# Patient Record
Sex: FEMALE | Race: WHITE | HISPANIC OR LATINO | URBAN - METROPOLITAN AREA
[De-identification: names, ages, dates, MRNs, and addresses within clinical notes are randomized per-mention and may not be internally consistent; named-entity substitution may affect disease eponyms.]

---
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
## 2022-10-14 NOTE — ED PEDIATRIC NURSE NOTE - NS ED NURSE RECORD ANOTHER VITAL SIGN
Subjective   Patient ID: Joyce is a 47 year old female.    Patient and provider first language is Samoan, per patient request,  visit was conducted in Samoan.  I  Reviewed prior clinic notes, consultants notes, ER notes, imaging and laboratory - 10  minutes    Chief Complaint   Patient presents with   • Physical     F/u on results     Physical and f/u chronic conditions, tests results    Tests results  Normal fasting glucose  Normal blood electrolytes  Normal kidney and liver function test  Elevated cholesterol of 218, elevated triglycerides at 201, recommended to do low-carb diet, regular physical activity and weight loss.   Normal blood cells, normal hemoglobin, no anemia    Physical  Hepatitis B Vaccine(1 of 3 - 3-dose series) Never done  Influenza Vaccine(1) due on 09/01/2022  Breast Cancer Screening due on 01/14/2023  Depression Screening due on 04/15/2023  Cervical Cancer Screen 30-64 - due on 11/02/2026  DTaP/Tdap/Td Vaccine(2 - Td or Tdap) due on 11/02/2031  Colorectal Cancer Screen- due on 06/09/2032  COVID-19 Vaccine Completed  Meningococcal Vaccine Aged Out  HPV Vaccine Aged Out  Pneumococcal Vaccine 0-64 Aged Out    HTN  Well conrtrolled  No CP, heart palpitations, SOB, orthopnea, pedal edema, no dizziness or lightheadedness.  No syncope.  Feels energy level is about the same  C/w  lisinopril 10 mg PO every day, refill sent to pharmacy  RTC 2-4 weeks with BP logs  Reassess with numbers management    Hyperlipidemia, mixed  Elevated cholesterol of 218, elevated triglycerides at 201;  recommended a low CH and low fat diet, regular physical activity and wg loss, exercising only 2 times a week bought [employer]   Repeat LP in 2 months, fasting if elevated numbers consider rx    Overweight  Elevated BMI above 25  Weight loss, regular physical activity, low-calorie diet        Patient's medications, allergies, past medical, surgical, social and family histories were reviewed and updated as appropriate.    Review  of Systems   Constitutional: Negative for chills, fatigue, fever and unexpected weight change.   HENT: Negative for congestion, mouth sores, postnasal drip, sinus pressure, sinus pain and sore throat.    Eyes: Negative for pain, discharge, redness and itching.   Respiratory: Negative for cough, shortness of breath and wheezing.    Cardiovascular: Negative for chest pain, palpitations and leg swelling.   Gastrointestinal: Negative for abdominal pain, blood in stool, constipation, diarrhea, nausea and vomiting.   Endocrine: Negative for cold intolerance and heat intolerance.   Genitourinary: Negative for dysuria and hematuria.   Musculoskeletal: Negative for arthralgias, gait problem and joint swelling.   Skin: Negative for rash.        Skin tag L arm   Neurological: Negative for tremors, seizures, syncope and headaches.   Psychiatric/Behavioral: Negative for behavioral problems, self-injury and suicidal ideas. The patient is not nervous/anxious.        Current Outpatient Medications   Medication Sig   • lisinopril (ZESTRIL) 10 MG tablet TAKE 1 TABLET BY MOUTH DAILY     No current facility-administered medications for this visit.       Objective   Visit Vitals  /89 (BP Location: LUE - Left upper extremity, Patient Position: Sitting, Cuff Size: Regular)   Pulse 81   Temp 97.6 °F (36.4 °C) (Tympanic)   Resp 20   Ht 5' 2\" (1.575 m)   Wt 73.4 kg (161 lb 11.3 oz)   LMP 10/07/2022 (Exact Date)   SpO2 99%   BMI 29.58 kg/m²     Physical Exam  Vitals and nursing note reviewed.   Constitutional:       General: She is not in acute distress.     Comments: Overweight   HENT:      Head: Normocephalic and atraumatic.      Right Ear: Tympanic membrane and external ear normal.      Left Ear: Tympanic membrane and external ear normal.      Nose: Nose normal.      Neck: Normal range of motion and neck supple.   Eyes:      General: No scleral icterus.        Right eye: No discharge.         Left eye: No discharge.       Conjunctiva/sclera: Conjunctivae normal.   Neck:      Thyroid: No thyromegaly.   Cardiovascular:      Rate and Rhythm: Normal rate and regular rhythm.      Heart sounds: Normal heart sounds. No murmur heard.  Pulmonary:      Effort: Pulmonary effort is normal.      Breath sounds: No wheezing or rales.   Abdominal:      General: Bowel sounds are normal.      Palpations: Abdomen is soft.      Tenderness: There is no abdominal tenderness. There is no guarding or rebound.   Musculoskeletal:         General: No tenderness or deformity. Normal range of motion.   Lymphadenopathy:      Cervical: No cervical adenopathy.   Skin:     Coloration: Skin is not pale.      Findings: No rash.      Comments: Skin tag L arm volar aspect 5 mm   Neurological:      Mental Status: She is alert and oriented to person, place, and time.      Coordination: Coordination normal.      Deep Tendon Reflexes: Reflexes are normal and symmetric.   Psychiatric:         Behavior: Behavior normal.         Thought Content: Thought content normal.         Judgment: Judgment normal.         Lab Results Reviewed, Diagnostic Data Reviewed and   Lab Services on 09/27/2022   Component Date Value Ref Range Status   • Fasting Status 09/27/2022 12  0 - 999 Hours Final   • Sodium 09/27/2022 138  135 - 145 mmol/L Final   • Potassium 09/27/2022 4.3  3.4 - 5.1 mmol/L Final   • Chloride 09/27/2022 107  97 - 110 mmol/L Final   • Carbon Dioxide 09/27/2022 23  21 - 32 mmol/L Final   • Anion Gap 09/27/2022 12  7 - 19 mmol/L Final   • Glucose 09/27/2022 90  70 - 99 mg/dL Final   • BUN 09/27/2022 7  6 - 20 mg/dL Final   • Creatinine 09/27/2022 0.63  0.51 - 0.95 mg/dL Final   • Glomerular Filtration Rate 09/27/2022 >90  >=60 Final   • BUN/ Creatinine Ratio 09/27/2022 11  7 - 25 Final   • Calcium 09/27/2022 8.9  8.4 - 10.2 mg/dL Final   • Bilirubin, Total 09/27/2022 0.5  0.2 - 1.0 mg/dL Final   • GOT/AST 09/27/2022 11  <=37 Units/L Final   • GPT/ALT 09/27/2022 27  <64 Units/L  Final   • Alkaline Phosphatase 09/27/2022 62  45 - 117 Units/L Final   • Albumin 09/27/2022 3.5 (A) 3.6 - 5.1 g/dL Final   • Protein, Total 09/27/2022 6.9  6.4 - 8.2 g/dL Final   • Globulin 09/27/2022 3.4  2.0 - 4.0 g/dL Final   • A/G Ratio 09/27/2022 1.0  1.0 - 2.4 Final   • Fasting Status 09/27/2022 12  0 - 999 Hours Final   • Cholesterol 09/27/2022 218 (A) <=199 mg/dL Final   • Triglycerides 09/27/2022 201 (A) <=149 mg/dL Final   • HDL 09/27/2022 54  >=50 mg/dL Final   • LDL 09/27/2022 124  <=129 mg/dL Final   • Non-HDL Cholesterol 09/27/2022 164  mg/dL Final   • Cholesterol/ HDL Ratio 09/27/2022 4.0  <=4.4 Final   • WBC 09/27/2022 5.8  4.2 - 11.0 K/mcL Final   • RBC 09/27/2022 4.57  4.00 - 5.20 mil/mcL Final   • HGB 09/27/2022 13.5  12.0 - 15.5 g/dL Final   • HCT 09/27/2022 39.9  36.0 - 46.5 % Final   • MCV 09/27/2022 87.3  78.0 - 100.0 fl Final   • MCH 09/27/2022 29.5  26.0 - 34.0 pg Final   • MCHC 09/27/2022 33.8  32.0 - 36.5 g/dL Final   • RDW-CV 09/27/2022 12.8  11.0 - 15.0 % Final   • RDW-SD 09/27/2022 40.9  39.0 - 50.0 fL Final   • PLT 09/27/2022 368  140 - 450 K/mcL Final   • NRBC 09/27/2022 0  <=0 /100 WBC Final   • Neutrophil, Percent 09/27/2022 48  % Final   • Lymphocytes, Percent 09/27/2022 41  % Final   • Mono, Percent 09/27/2022 6  % Final   • Eosinophils, Percent 09/27/2022 4  % Final   • Basophils, Percent 09/27/2022 1  % Final   • Immature Granulocytes 09/27/2022 0  % Final   • Absolute Neutrophils 09/27/2022 2.8  1.8 - 7.7 K/mcL Final   • Absolute Lymphocytes 09/27/2022 2.4  1.0 - 4.8 K/mcL Final   • Absolute Monocytes 09/27/2022 0.3  0.3 - 0.9 K/mcL Final   • Absolute Eosinophils  09/27/2022 0.2  0.0 - 0.5 K/mcL Final   • Absolute Basophils 09/27/2022 0.0  0.0 - 0.3 K/mcL Final   • Absolute Immmature Granulocytes 09/27/2022 0.0  0.0 - 0.2 K/mcL Final        Patient Active Problem List   Diagnosis   • Essential hypertension   • Establishing care with new doctor, encounter for   • Non-smoker   •  Breast cancer screening   • Well adult exam   • Encounter for screening for HIV   • Screen for STD (sexually transmitted disease)   • Encounter to discuss test results   • Dense breasts   • Overweight (BMI 25.0-29.9)   • Counseling, unspecified   • Mixed dyslipidemia   • Dietary counseling   • Annual visit for general adult medical examination with abnormal findings   • Screening for diabetes mellitus (DM)   • Prescription medication started   • Greater trochanteric bursitis of left hip   • Cervical lesion   • Pap smear abnormality of cervix with ASCUS favoring benign       Assessment     Multiple differential diagnoses were considered. The patient / caregivers were apprised of diagnostic / treatment options including alternate modes of care, in addition to the risks and benefits, for this medical condition. Based on this discussion the patient / caregiver agrees with this chosen diagnostic and treatment plan.    Problem List Items Addressed This Visit        Cardiac and Vasculature    Essential hypertension    Mixed dyslipidemia       Endocrine and Metabolic    Overweight (BMI 25.0-29.9)       Health Encounters    Annual visit for general adult medical examination with abnormal findings - Primary       Mental Health    Counseling, unspecified       Tobacco    Non-smoker      Other Visit Diagnoses     Need for vaccination        Relevant Orders    HEP B VACC PEDIATRIC/ADOLESCENT, IM    INFLUENZA QUADRIVALENT SPLIT PRES FREE 0.5 ML VACC, IM (FLULAVAL,FLUARIX,FLUZONE)          Total time spent caring for this patient on the day of the encounter was   45 OV no including wellness/physical  This includes pre-charting, chart review, documenting and referring/communicating with other health care professionals.   Total time spent on the date of patient's encounter included:  · time spent before the visit reviewing the patient’s current medical record (recent visits including specialist consultations, labs, and  studies)  · time spent before the visit reviewing the patient’s outside medical records through [facility] and reconciling outside pharmacy records  · seeing the patient in the office (face-to-face time)  · counseling patient regarding problems assessed and addressed today and answering multiple questions   · time spent documenting this visit      Schedule follow up: in 3 months    Patient Privacy Notice      The 21st Century Cures Act makes medical notes like these available to patients in the interest of transparency. Please be advised that this is a medical document. Medical documents are intended to carry relevant information and the clinical opinion of the practitioner. The medical note is intended as medical provider to provider communication, and may appear blunt or direct. It is written in medical language, and may contain abbreviations or verbiage that are unfamiliar.   Yes

## 2023-06-19 ENCOUNTER — NON-APPOINTMENT (OUTPATIENT)
Age: 23
End: 2023-06-19

## 2023-06-19 ENCOUNTER — APPOINTMENT (OUTPATIENT)
Dept: OBGYN | Facility: CLINIC | Age: 23
End: 2023-06-19
Payer: COMMERCIAL

## 2023-06-19 VITALS
HEIGHT: 70 IN | WEIGHT: 150.6 LBS | HEART RATE: 78 BPM | SYSTOLIC BLOOD PRESSURE: 124 MMHG | BODY MASS INDEX: 21.56 KG/M2 | DIASTOLIC BLOOD PRESSURE: 83 MMHG

## 2023-06-19 DIAGNOSIS — Z78.9 OTHER SPECIFIED HEALTH STATUS: ICD-10-CM

## 2023-06-19 DIAGNOSIS — Z82.5 FAMILY HISTORY OF ASTHMA AND OTHER CHRONIC LOWER RESPIRATORY DISEASES: ICD-10-CM

## 2023-06-19 DIAGNOSIS — Z87.42 PERSONAL HISTORY OF OTHER DISEASES OF THE FEMALE GENITAL TRACT: ICD-10-CM

## 2023-06-19 DIAGNOSIS — Z01.419 ENCOUNTER FOR GYNECOLOGICAL EXAMINATION (GENERAL) (ROUTINE) W/OUT ABNORMAL FINDINGS: ICD-10-CM

## 2023-06-19 DIAGNOSIS — Z11.3 ENCOUNTER FOR SCREENING FOR INFECTIONS WITH A PREDOMINANTLY SEXUAL MODE OF TRANSMISSION: ICD-10-CM

## 2023-06-19 PROCEDURE — 99385 PREV VISIT NEW AGE 18-39: CPT

## 2023-06-19 PROCEDURE — 36415 COLL VENOUS BLD VENIPUNCTURE: CPT

## 2023-06-19 NOTE — PHYSICAL EXAM
[Chaperone Present] : A chaperone was present in the examining room during all aspects of the physical examination [FreeTextEntry1] : patients mother [Appropriately responsive] : appropriately responsive [Alert] : alert [No Acute Distress] : no acute distress [Soft] : soft [Non-tender] : non-tender [Non-distended] : non-distended [No Lesions] : no lesions [No Mass] : no mass [Oriented x3] : oriented x3 [Examination Of The Breasts] : a normal appearance [No Masses] : no breast masses were palpable [Labia Majora] : normal [Labia Minora] : normal [Normal] : normal [Uterine Adnexae] : normal

## 2023-06-19 NOTE — HISTORY OF PRESENT ILLNESS
[FreeTextEntry1] : 24 y/o G 0 female, LMP: 6/11/2023, presents for annual GYN visit.  Patient denies any GYN complaints. \par \par menarche: age 15\par Menstrual Cycle: regular, monthly, mild pain and bleeding.\par Sexual Activity: previously sexually active\par lifetime partners: 12\par Contraception: none\par Social/Mental Health: reports social ETOH, denies tobacco or any illicit drug use.  MDD, anxiety, and nonspecific mood disorder.  Denies depression, anxiety, thoughts of personal harm or suicidal ideation.\par \par ROS:  Denies fever/chills, HA, Cough/sore throat, CP, SOB, N/V, Diarrhea/Constipation, Pelvic pain,\par Urinary frequency/urgency/incontinence, irregular vaginal bleeding, discharge or irritation.  \par \par Medical History\par GYN HX: ovarian cyst\par PMH: denies, epistaxis as a child (age 10)\par PSH: tonsillectomy/adenoids 2006\par Meds: none\par Allergies: NKDA\par fam hx: paternal aunt and paternal cousins: breast Ca. mother: asthma, MVP [Normal Amount/Duration] :  normal amount and duration [No] : Patient does not have concerns regarding sex [Currently Active] : currently active [Men] : men [Condoms] : Condoms [Patient would like to be screened for STIs] : Patient would like to be screened for STIs

## 2023-06-19 NOTE — DISCUSSION/SUMMARY
[FreeTextEntry1] : sensitive portions of interview were done with mother in waiting area.\par thin prep w hpv reflex and STI testing ordered.\par RTO in 1 year or sooner for any gyn issues.\par Patient verbalized understanding.\par

## 2023-06-19 NOTE — COUNSELING
[Nutrition/ Exercise/ Weight Management] : nutrition, exercise, weight management [Vitamins/Supplements] : vitamins/supplements [Contraception/ Emergency Contraception/ Safe Sexual Practices] : contraception, emergency contraception, safe sexual practices [Confidentiality] : confidentiality [STD (testing, results, tx)] : STD (testing, results, tx)

## 2023-06-20 LAB
C TRACH RRNA SPEC QL NAA+PROBE: NOT DETECTED
HIV1+2 AB SPEC QL IA.RAPID: NONREACTIVE
N GONORRHOEA RRNA SPEC QL NAA+PROBE: NOT DETECTED
SOURCE AMPLIFICATION: NORMAL
T PALLIDUM AB SER QL IA: NEGATIVE

## 2023-06-26 ENCOUNTER — NON-APPOINTMENT (OUTPATIENT)
Age: 23
End: 2023-06-26

## 2023-06-26 ENCOUNTER — TRANSCRIPTION ENCOUNTER (OUTPATIENT)
Age: 23
End: 2023-06-26

## 2023-06-26 LAB
CYTOLOGY CVX/VAG DOC THIN PREP: ABNORMAL
HBV SURFACE AG SER QL: NONREACTIVE
HCV AB SER QL: NONREACTIVE
HCV S/CO RATIO: 0.19 S/CO

## 2025-01-22 NOTE — ED PEDIATRIC NURSE NOTE - NSSISCREENINGQ5_ED_A_ED
[Patient Intake Form Reviewed] : Patient intake form was reviewed [Negative] : Allergic/Immunologic No

## 2025-04-01 ENCOUNTER — NON-APPOINTMENT (OUTPATIENT)
Age: 25
End: 2025-04-01